# Patient Record
Sex: FEMALE | Race: WHITE | NOT HISPANIC OR LATINO
[De-identification: names, ages, dates, MRNs, and addresses within clinical notes are randomized per-mention and may not be internally consistent; named-entity substitution may affect disease eponyms.]

---

## 2021-04-02 PROBLEM — Z00.00 ENCOUNTER FOR PREVENTIVE HEALTH EXAMINATION: Status: ACTIVE | Noted: 2021-04-02

## 2021-04-07 ENCOUNTER — APPOINTMENT (OUTPATIENT)
Dept: BREAST CENTER | Facility: CLINIC | Age: 61
End: 2021-04-07
Payer: COMMERCIAL

## 2021-04-07 VITALS — HEIGHT: 62 IN | HEART RATE: 76 BPM | SYSTOLIC BLOOD PRESSURE: 132 MMHG | DIASTOLIC BLOOD PRESSURE: 82 MMHG

## 2021-04-07 DIAGNOSIS — Z80.1 FAMILY HISTORY OF MALIGNANT NEOPLASM OF TRACHEA, BRONCHUS AND LUNG: ICD-10-CM

## 2021-04-07 DIAGNOSIS — Z78.9 OTHER SPECIFIED HEALTH STATUS: ICD-10-CM

## 2021-04-07 PROCEDURE — 99245 OFF/OP CONSLTJ NEW/EST HI 55: CPT

## 2021-04-07 PROCEDURE — 99072 ADDL SUPL MATRL&STAF TM PHE: CPT

## 2021-04-07 RX ORDER — CHOLECALCIFEROL (VITAMIN D3) 25 MCG
TABLET ORAL
Refills: 0 | Status: ACTIVE | COMMUNITY

## 2021-04-07 RX ORDER — ALPRAZOLAM 0.5 MG/1
0.5 TABLET ORAL
Qty: 3 | Refills: 0 | Status: ACTIVE | COMMUNITY
Start: 2021-04-07 | End: 1900-01-01

## 2021-04-07 RX ORDER — MULTIVIT-MIN/IRON/FOLIC ACID/K 18-600-40
CAPSULE ORAL
Refills: 0 | Status: ACTIVE | COMMUNITY

## 2021-04-07 RX ORDER — BIOTIN 10 MG
TABLET ORAL
Refills: 0 | Status: ACTIVE | COMMUNITY

## 2021-04-07 NOTE — REVIEW OF SYSTEMS
[Loss Of Hearing] : hearing loss [Shortness Of Breath] : shortness of breath [Lower Back Pain] : lower back pain [Mid Back Pain] : mid back pain [Upper Back Pain] : upper back pain [Negative] : Heme/Lymph

## 2021-04-08 ENCOUNTER — APPOINTMENT (OUTPATIENT)
Dept: HEMATOLOGY ONCOLOGY | Facility: CLINIC | Age: 61
End: 2021-04-08
Payer: COMMERCIAL

## 2021-04-08 ENCOUNTER — LABORATORY RESULT (OUTPATIENT)
Age: 61
End: 2021-04-08

## 2021-04-08 VITALS
OXYGEN SATURATION: 98 % | TEMPERATURE: 98.2 F | BODY MASS INDEX: 32.02 KG/M2 | RESPIRATION RATE: 18 BRPM | DIASTOLIC BLOOD PRESSURE: 81 MMHG | SYSTOLIC BLOOD PRESSURE: 147 MMHG | HEART RATE: 90 BPM | WEIGHT: 174 LBS | HEIGHT: 62 IN

## 2021-04-08 DIAGNOSIS — R53.83 OTHER FATIGUE: ICD-10-CM

## 2021-04-08 DIAGNOSIS — Z80.3 FAMILY HISTORY OF MALIGNANT NEOPLASM OF BREAST: ICD-10-CM

## 2021-04-08 DIAGNOSIS — E55.9 VITAMIN D DEFICIENCY, UNSPECIFIED: ICD-10-CM

## 2021-04-08 DIAGNOSIS — C50.912 MALIGNANT NEOPLASM OF UNSPECIFIED SITE OF LEFT FEMALE BREAST: ICD-10-CM

## 2021-04-08 DIAGNOSIS — C50.812 MALIGNANT NEOPLASM OF OVERLAPPING SITES OF LEFT FEMALE BREAST: ICD-10-CM

## 2021-04-08 DIAGNOSIS — Z17.0 MALIGNANT NEOPLASM OF OVERLAPPING SITES OF LEFT FEMALE BREAST: ICD-10-CM

## 2021-04-08 DIAGNOSIS — R06.02 SHORTNESS OF BREATH: ICD-10-CM

## 2021-04-08 PROCEDURE — 99072 ADDL SUPL MATRL&STAF TM PHE: CPT

## 2021-04-08 PROCEDURE — 99205 OFFICE O/P NEW HI 60 MIN: CPT

## 2021-04-08 NOTE — HISTORY OF PRESENT ILLNESS
[de-identified] : This is a 60-year-old woman presenting for new diagnosis of breast cancer.\par Patient has a prior history of a breast biopsy in 2016 or 2017 - for malignancy. right breast . Mammogramand ultrasound. \par She was going for routine screening mammograms on a regular basis and did not notice any new breast changes.\par Mammogram and ultrasound performed in March 2021 at [facility] revealing for developing nodule at the 6 o'clock position left breast- seen on ultrasound measuring 6 x 5 x 3 mm retroareolar slightly irregular.  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

## 2021-04-08 NOTE — REVIEW OF SYSTEMS
[Negative] : Endocrine [Fever] : no fever [Night Sweats] : no night sweats [Cough] : no cough [FreeTextEntry2] : covid in oct 2020- decreased energy , weight is stable  [FreeTextEntry5] : no heart problems , went for echo( fluid )  , was supposed to go for stress, Dr Almaguer (cardiology - [place])  [FreeTextEntry6] : had cxr , breathing is better , SOB on exertion  [FreeTextEntry9] : muscle spasm in right arm , pain in breast  today since visit , sometimes  back

## 2021-04-08 NOTE — REASON FOR VISIT
[Initial Consultation] : an initial consultation [FreeTextEntry2] : Patient presents for initial consultation for HER-2 positive breast cancer

## 2021-04-08 NOTE — ASSESSMENT
[FreeTextEntry1] : This is a 60-year-old woman, presenting with what appears to be an early stage I HER-2 positive ER positive breast cancer.\par \par \par 1) breast cancer\par Imaging revealing for a new 6 mm mass which was biopsied and shown to be a HER-2 positive ER positive breast cancer, I  reviewed this diagnosis at length with the patient and her  today\par We discussed the importance of the MRI to further delineate the extent of disease.\par If the patient has no evidence of more extensive disease then we would favor upfront surgery by systemic therapy\par If the breast cancer is larger, multifocal or lymph node positive we may will favor  upfront systemic therapy with anti-HER-2  with chemotherapy \par I explained the importance of the HER-2 marker and that it usually predicts for a more aggressive phenotype even with small cancers.  And is very responsive to chemotherapy and HER-2 directed therapy such as trastuzumab and Perjeta.\par I also explained that if surgery is planned upfront, we will need to assess the size and lymph node status postoperatively to determine the best course of chemotherapy.  If the tumor remains tiny less than 1 cm I would favor the Chelsie-Hazel regimen of weekly paclitaxel and Herceptin followed by a year of Herceptin.  If the tumor is larger and/or lymph node positive on pathology I would favor TCHP followed by Herceptin and Perjeta.\par I explained the potential cardiotoxicity of Herceptin and Perjeta\par Patient recently had an echocardiogram that was reportedly abnormal so I have requested the records from cardiology and also have placed a call Dr. Avila\par We also briefly touched on additional side effects of chemotherapy without committing to 1 regimen again this will be dependent on the results of the MRI and the pathology.  But explaining the possibility for hair loss nausea vomiting constipation neuropathy and allergic reaction.\par We will discuss this much more at length once we know which regimen were going with and what the potential schedule and side effects will be.\par I do not think the patient needs a PET CT scan as she has a very early stage cancer\par The patient will also benefit from adjuvant radiation therapy as well as antiestrogen.  We again discussed antiestrogens briefly but will discuss in much more detail  when the time comes \par Full set of blood work was sent today\par \par 2) fatigue \par B12 folate TSH and thyroid were checked today\par \par 3) vit d \par check level \par \par 4) cardiac \par placed call to cardiology , left message with my cell phone \par \par Discussed with patient and spouse at length\par Time spent over 1 hour \par

## 2021-04-08 NOTE — PHYSICAL EXAM
[Obese] : obese [Normal] : affect appropriate [de-identified] : Bruising under left breast otherwise unremarkable

## 2021-04-09 RX ORDER — AZITHROMYCIN 250 MG/1
250 TABLET, FILM COATED ORAL
Qty: 6 | Refills: 0 | Status: ACTIVE | COMMUNITY
Start: 2020-10-23

## 2021-04-09 RX ORDER — BUDESONIDE AND FORMOTEROL FUMARATE DIHYDRATE 80; 4.5 UG/1; UG/1
80-4.5 AEROSOL RESPIRATORY (INHALATION)
Qty: 10 | Refills: 0 | Status: ACTIVE | COMMUNITY
Start: 2020-11-04

## 2021-04-09 RX ORDER — ALBUTEROL SULFATE 90 UG/1
108 (90 BASE) INHALANT RESPIRATORY (INHALATION)
Qty: 8 | Refills: 0 | Status: ACTIVE | COMMUNITY
Start: 2020-10-30

## 2021-04-09 RX ORDER — METHYLPREDNISOLONE 4 MG/1
4 TABLET ORAL
Qty: 21 | Refills: 0 | Status: ACTIVE | COMMUNITY
Start: 2020-10-29

## 2021-04-09 RX ORDER — PREDNISONE 5 MG/1
5 TABLET, DELAYED RELEASE ORAL
Qty: 30 | Refills: 0 | Status: ACTIVE | COMMUNITY
Start: 2020-11-17

## 2021-04-09 NOTE — CONSULT LETTER
[Dear  ___] : Dear ~APURVA, [( Thank you for referring [unfilled] for consultation for _____ )] : Thank you for referring [unfilled] for consultation for [unfilled] [Please see my note below.] : Please see my note below. [Consult Closing:] : Thank you very much for allowing me to participate in the care of this patient.  If you have any questions, please do not hesitate to contact me. [Sincerely,] : Sincerely, [DrSandra  ___] : Dr. CARRILLO [FreeTextEntry3] : Kaity Pradhan MS DO\par Breast Surgeon\par [facility] \[place] [address]\par

## 2021-04-09 NOTE — HISTORY OF PRESENT ILLNESS
[FreeTextEntry1] : This is a 60 year old female referred by Ariana Jerome for moderately differentiated invasive ductal carcinoma of  left breast 6:00 retro (heart clip), ER+,WI+, Her2 + Ki67 is strongly staining at 35%. This was found thru ultrasound biopsy done on 3/30/2021 at [facility] for a newly developing [id number]  hypoechoic mass with slightly irregular contour and posterior shadowing.  She states she had a breast biopsy in 2016 or 2017, pathology unavailable at time of consult, but states it was benign. Denies any trauma, changes to herbs, diet or supplements.She would like to visit her godmother in [place] this summer. She presents with her  for consultation.\par \par She does SBE. \par She has not noticed a change in her breast or a breast lump.except post bx changes\par She has not noticed a change in her nipple or nipple area.\par She has not noticed a change in the skin of the breast. except post bx changes\par She is not experiencing nipple discharge.\par She is not experiencing breast pain.\par She has not noticed a lump or lymph node under the armpit. \par \par BREAST CANCER RISK FACTORS\par Menarche: [birth year]\par Menopause: 56\par Grav: 2     Para: 2\par Age at first live birth: 29\par Nursed: no\par Hysterectomy: no\par Oophorectomy: no \par OCP: no\par HRT: no\par Last pap/pelvic exam: 2018 WNL\par Related family history:mother breast CA @ 60\par Ashkenazi: no\par Mastery risk assessment: N/A\par BRCA testing: yes 2015 My Risk panel negative\par Bra size: C\par \par Last mammogram: 3/24/2021                              Location: [facility]\par Report reviewed.                                 Images reviewed on PACS\par Results: Birads 4\par Heterogeneously dense breast tissue\par No evidence of malignancy\par \par Last ultrasound:3/24/2021                                   Location: [facility]\par Report reviewed.                                 Images reviewed on PACS\par Results: Birads 4\par Left 6:00 retro 6x5x3 mm hypoechoic mass with irregular contour and posterior shadowing. Red us bx\par Left 3:00 N6 5x4x3 mm benign appearing nodule unchanged.\par Right 9:00 N5 6x9x2 mm benign appearing nodule unchanged.\par \par Last MRI: 2008                                   Location: [facility]\par Report unable to be viewed. \par

## 2021-04-09 NOTE — ASSESSMENT
[FreeTextEntry1] : The pathology and imaging results were reviewed and discussed. She is a stage IA left breast cancer (T1bN0) HER2 positive, ER+/MA+ (AJCC 8thed).\par \par Breast MRI is recommended for further evaluation of the extent of disease/possible presence of multicentric and/or contralateral disease. She has an appointment Friday 4/9/2021.\par \par The use of multimodality therapy for the treatment of breast cancer was discussed.  \par Surgical options were reviewed including a lumpectomy to negative margins, with whole breast radiation therapy versus a mastectomy with or without reconstruction. Included in this discussion with the results of the NSABP-04 and 06 trials.\par \par  Mastectomy techniques were discussed in detail including skin sparing and nipple sparing techniques. Recurrence was reviewed and that mastectomy does not confer a 100% risk reduction nor guarantee against breast cancer in the future.\par \par Reconstructive options were briefly reviewed, including implant based versus tissue flap based reconstruction options.  Referral  to a plastic surgeon was offered.  The patient was informed that breast reconstruction is covered by insurance per state and federal laws.   \par \par Axillary staging was discussed. We reviewed the sentinel lymph node procedure, and how if the sentinel lymph node is found to have metastatic disease either on the intraoperative evaluation or on the final pathology, that an axillary dissection of the remaining lymph nodes may be recommended. It was explained that an axillary dissection takes "level one and level two" lymph nodes, and also "level three" if they feel clinically suspicious. It was explained that if the sentinel lymph node was not able to be found, that an axillary dissection may be necessary.  I reviewed the risks of lymphedema for SLND (6%) versus ALND (20%). I reviewed our lymphedema monitoring program.\par \par The Z-0011 study was touched upon, outlining that there is evidence that it may not be necessary to complete an axillary dissection in select patients even if one or two sentinel nodes are positive for cancer, as long as the cancer in the nodes is confined to within the nodes, the nodes aren't  matted down, and if the cancer meets certain criteria including being less than 5 cm, treated by lumpectomy and conventional whole breast radiation, and the patient is planning to take recommended adjuvant therapy, and didn't require preoperative chemotherapy.   \par \par The general indications for the use of adjuvant hormonal and chemotherapy and targeted therapies were discussed. It was explained that medical treatments are dependent on pathology results including receptor studies.  The patient was advised to meet with a medical oncologist since she is HER-2 positive. She has an appointment tomorrow with Dr. Parkinson. \par \par The indications for radiation therapy and side effects were also discussed including the use varying lengths of whole breast radiation.  It was explained that radiation is generally reserved for lumpectomy patients, and patients with larger tumors or positive lymph nodes. Common side effects were reviewed. She is not a candidate for intraoperative radiation therapy. \par \par The genetics of breast cancer were discussed with the implications for risk of contralateral cancer and other associated cancers, implication for ovarian risk, options for management, and implications for family members. Explained that her VUS was re-classified in 2018 and she is negative.\par \par She met with our cancer navigator and was given a cancer binder. She is motivated towards BCT and symmetrizing lift. She is having her MRI Friday. I will obtain 2nd opinion pathology at [facility] and call her with results of these. She states that she will be getting a second opinion as well. I believe I was able to answer all of her questions to her satisfaction.\par \par She knows to call or return sooner should any concerns or questions arise.\par \par \par

## 2021-04-09 NOTE — PHYSICAL EXAM
[Normocephalic] : normocephalic [Atraumatic] : atraumatic [Supple] : supple [No Supraclavicular Adenopathy] : no supraclavicular adenopathy [Examined in the supine and seated position] : examined in the supine and seated position [No Nipple Retraction] : no left nipple retraction [No Nipple Discharge] : no left nipple discharge [No Axillary Lymphadenopathy] : no left axillary lymphadenopathy [No Edema] : no edema [No Rashes] : no rashes [No Ulceration] : no ulceration [Symmetrical] : symmetrical [No dominant masses] : no dominant masses in right breast  [de-identified] : 5mm nodular mass c/w known cancer, no skin involvement, bruising as expected post op

## 2021-04-11 LAB
25(OH)D3 SERPL-MCNC: 33.5 NG/ML
ALBUMIN SERPL ELPH-MCNC: 5 G/DL
ALP BLD-CCNC: 81 U/L
ALT SERPL-CCNC: 24 U/L
ANION GAP SERPL CALC-SCNC: 16 MMOL/L
AST SERPL-CCNC: 26 U/L
BASOPHILS # BLD AUTO: 0.05 K/UL
BASOPHILS NFR BLD AUTO: 0.7 %
BILIRUB SERPL-MCNC: 0.2 MG/DL
BUN SERPL-MCNC: 14 MG/DL
CALCIUM SERPL-MCNC: 10.2 MG/DL
CANCER AG15-3 SERPL-ACNC: 7.4 U/ML
CEA SERPL-MCNC: 0.9 NG/ML
CHLORIDE SERPL-SCNC: 103 MMOL/L
CO2 SERPL-SCNC: 23 MMOL/L
CREAT SERPL-MCNC: 0.73 MG/DL
EOSINOPHIL # BLD AUTO: 0.08 K/UL
EOSINOPHIL NFR BLD AUTO: 1.1 %
FERRITIN SERPL-MCNC: 118 NG/ML
FOLATE SERPL-MCNC: 10.4 NG/ML
GLUCOSE SERPL-MCNC: 71 MG/DL
HCT VFR BLD CALC: 41.4 %
HGB BLD-MCNC: 13.4 G/DL
IMM GRANULOCYTES NFR BLD AUTO: 0.3 %
IRON SATN MFR SERPL: 16 %
IRON SERPL-MCNC: 51 UG/DL
LDH SERPL-CCNC: 277 U/L
LYMPHOCYTES # BLD AUTO: 2.75 K/UL
LYMPHOCYTES NFR BLD AUTO: 36.4 %
MAN DIFF?: NORMAL
MCHC RBC-ENTMCNC: 30.1 PG
MCHC RBC-ENTMCNC: 32.4 GM/DL
MCV RBC AUTO: 93 FL
MONOCYTES # BLD AUTO: 0.42 K/UL
MONOCYTES NFR BLD AUTO: 5.6 %
NEUTROPHILS # BLD AUTO: 4.23 K/UL
NEUTROPHILS NFR BLD AUTO: 55.9 %
PLATELET # BLD AUTO: 320 K/UL
POTASSIUM SERPL-SCNC: 4.4 MMOL/L
PROT SERPL-MCNC: 7.1 G/DL
RBC # BLD: 4.45 M/UL
RBC # FLD: 12.5 %
SODIUM SERPL-SCNC: 143 MMOL/L
TIBC SERPL-MCNC: 325 UG/DL
TSH SERPL-ACNC: 1.57 UIU/ML
UIBC SERPL-MCNC: 275 UG/DL
VIT B12 SERPL-MCNC: 877 PG/ML
WBC # FLD AUTO: 7.55 K/UL

## 2021-04-13 ENCOUNTER — NON-APPOINTMENT (OUTPATIENT)
Age: 61
End: 2021-04-13

## 2021-04-23 ENCOUNTER — NON-APPOINTMENT (OUTPATIENT)
Age: 61
End: 2021-04-23

## 2021-04-28 ENCOUNTER — APPOINTMENT (OUTPATIENT)
Dept: PLASTIC SURGERY | Facility: CLINIC | Age: 61
End: 2021-04-28